# Patient Record
Sex: MALE | Race: WHITE
[De-identification: names, ages, dates, MRNs, and addresses within clinical notes are randomized per-mention and may not be internally consistent; named-entity substitution may affect disease eponyms.]

---

## 2015-06-19 VITALS
DIASTOLIC BLOOD PRESSURE: 77 MMHG | SYSTOLIC BLOOD PRESSURE: 137 MMHG | SYSTOLIC BLOOD PRESSURE: 137 MMHG | DIASTOLIC BLOOD PRESSURE: 77 MMHG | SYSTOLIC BLOOD PRESSURE: 137 MMHG | DIASTOLIC BLOOD PRESSURE: 77 MMHG

## 2017-05-30 ENCOUNTER — HOSPITAL ENCOUNTER (OUTPATIENT)
Dept: HOSPITAL 61 - ECHO | Age: 72
Discharge: HOME | End: 2017-05-30
Attending: INTERNAL MEDICINE
Payer: MEDICARE

## 2017-05-30 DIAGNOSIS — E78.5: ICD-10-CM

## 2017-05-30 DIAGNOSIS — I25.10: ICD-10-CM

## 2017-05-30 DIAGNOSIS — I10: ICD-10-CM

## 2017-05-30 DIAGNOSIS — I08.1: Primary | ICD-10-CM

## 2017-05-30 DIAGNOSIS — F17.200: ICD-10-CM

## 2017-05-30 DIAGNOSIS — R00.1: ICD-10-CM

## 2017-05-30 PROCEDURE — 93306 TTE W/DOPPLER COMPLETE: CPT

## 2017-05-30 NOTE — CARD
--------------- APPROVED REPORT --------------





EXAM: Two-dimensional and M-mode echocardiogram with Doppler and color Doppler.



Other Information 

Quality : GoodHR: 50bpm

Rhythm : Bradycardia



INDICATION

Dyspnea 



RISK FACTORS

Hypertension 

Hyperlipidemia

Smoking 



2D DIMENSIONS 

RVDd2.8 (2.9-3.5cm)Left Atrium(2D)5.0 (1.6-4.0cm)

IVSd1.2 (0.7-1.1cm)Aortic Root(2D)3.2 (2.0-3.7cm)

LVDd5.6 (3.9-5.9cm)LVOT Diameter2.4 (1.8-2.4cm)

PWd1.3 (0.7-1.1cm)LVDs3.8 (2.5-4.0cm)

FS (%) 32.9 %SV94.4 ml

LVEF(%)60.7 (>50%)



Aortic Valve

AoV Peak Lisandro.99.0cm/sAoV VTI23.1cm

AO Peak GR.3.9mmHgLVOT Peak Lisandro.64.5cm/s

LVOT  VTI 14.31cmAO Mean GR.2mmHg

MUNIRA (VMAX)2.69nf7CUX   (VTI)2.89cm2



Mitral Valve

MV E Dfysvrde93.5cm/sMV DECEL [id number]

MV A Fwdzvqrp75.9cm/sMV E Mean Gr.1mmHg

MV YVG26uzA/A  Ratio1.4

MV A Ghillejf86pkHNX (PHT)2.58cm2



TDI

E/Lateral E'6.0E/Medial E'7.8



Pulmonary Valve

PV Peak Srwxjqfl24.5cm/sPV Peak Grad.2mmHg



Tricuspid Valve

TR P. Vibzygwo170ca/sTR Peak Gr.22mmHg



Pulmonary Vein

S1 Nhtlbpof76.1cm/sD2 Pijohnxz35.8cm/s

PVa [id number]



 LEFT VENTRICLE 

The left ventricle is normal size. There is mild concentric left ventricular hypertrophy. Low normal 
EF of 50-55% Subtle mild global hypokinesis. Otherwise, no significant wall motion abnormalities. The
 left ventricular diastolic function and filling is normal for age. No left ventricle thrombus noted 
on this study.



 RIGHT VENTRICLE 

The right ventricle is normal size. There is normal right ventricular wall thickness. The right ventr
icular systolic function is normal.



 ATRIA 

The left atrium is mdoerately dilated. The right atrium size is normal. The interatrial septum is int
act with no evidence for an atrial septal defect or patent foramen ovale as noted on 2-D or Doppler i
maging.



 AORTIC VALVE 

The aortic valve is not well visualized. The aortic valve is trileaflet. Doppler and Color Flow revea
led no significant aortic regurgitation. There is no significant aortic valvular stenosis.



 MITRAL VALVE 

The mitral valve leaflets are moderately thickened. There is no evidence of mitral valve prolapse. Th
ere is no mitral valve stenosis. Doppler and Color Flow revealed mild mitral regurgitation.



 TRICUSPID VALVE 

Doppler and Color Flow revealed mild tricuspid regurgitation. The pulmonary artery systolic pressure 
is estimated at 25 mmHg. There is no pulmonary hypertension.



 PULMONIC VALVE 

Doppler and Color Flow revealed trace pulmonic valvular regurgitation. There is no pulmonic valvular 
stenosis.



 GREAT VESSELS 

The aortic root is normal in size. The ascending aorta is normal in size. The pulmonary artery is nor
mal. The IVC is normal in size and collapses >50% with inspiration.



 PERICARDIAL EFFUSION 

There is no evidence of significant pericardial effusion.



Critical Notification

Critical Value: No



<Conclusion>

The left ventricular systolic function is normal and the ejection fraction is within normal range.  T
he Ejection Fraction is 55%.

Low normal EF of 50-55%

Subtle mild global hypokinesis. Otherwise, no significant wall motion abnormalities.

Doppler and Color Flow revealed mild mitral regurgitation.

## 2019-08-26 ENCOUNTER — HOSPITAL ENCOUNTER (OUTPATIENT)
Dept: HOSPITAL 61 - CT | Age: 74
Discharge: HOME | End: 2019-08-26
Attending: INTERNAL MEDICINE
Payer: MEDICARE

## 2019-08-26 DIAGNOSIS — J84.10: ICD-10-CM

## 2019-08-26 DIAGNOSIS — I25.10: ICD-10-CM

## 2019-08-26 DIAGNOSIS — Z95.1: ICD-10-CM

## 2019-08-26 DIAGNOSIS — I70.0: ICD-10-CM

## 2019-08-26 DIAGNOSIS — Z12.2: Primary | ICD-10-CM

## 2019-08-26 DIAGNOSIS — R91.8: ICD-10-CM

## 2019-08-26 DIAGNOSIS — Z87.891: ICD-10-CM

## 2019-08-26 DIAGNOSIS — J43.9: ICD-10-CM

## 2019-08-26 DIAGNOSIS — K76.89: ICD-10-CM

## 2019-08-26 DIAGNOSIS — N28.1: ICD-10-CM

## 2019-08-26 PROCEDURE — 71250 CT THORAX DX C-: CPT

## 2019-08-26 NOTE — RAD
PQRS Compliance Statement:

 

One or more of the following individualized dose reduction techniques were

utilized for this examination:  

1. Automated exposure control  

2. Adjustment of the mA and/or kV according to patient size  

3. Use of iterative reconstruction technique

 

CT chest without contrast (lung cancer screening) 8/26/2019

 

INDICATION: Smoker for 30 years, smoking 1 pack a day.

 

COMPARISON: None available

 

TECHNIQUE: Multiple axial CT images of the chest were obtained without 

intravenous contrast utilizing low-dose technique. Coronal and sagittal 

reformats are provided.

 

FINDINGS:

 

There is biapical pleural-parenchymal scarring. Mild paraseptal 

emphysematous changes are identified in the upper lobes. Subpleural 

calcified granulomas identified in the anterior right middle lobe 

measuring 7 mm. Subpleural calcified nodule is identified in the posterior

right lower lobe measuring 5 mm. There is a subpleural calcified nodule in

the inferior lingula measuring 14 mm. No solid noncalcified pulmonary 

nodules are identified. There are no pleural effusions. No pulmonary 

vascular congestion or pneumothorax.

 

No pathologically enlarged thoracic lymph nodes. Heart size is within 

normal limits. Coronary artery vascular calcifications are present. 

Thoracic aorta is normal in course and caliber with mild calcified 

atheromatous plaque.

 

Median sternotomy changes are present. No suspicious osseous abnormality 

is identified.

 

Left hepatic lobe cyst measures 18 mm. Right hepatic lobe cyst measures 15

mm. Spleen and bilateral adrenal glands are normal in appearance. Superior

pole left renal cyst measures 5.7 cm with minimal peripheral 

calcification. Right interpolar renal cyst measures 6.5 cm.

 

IMPRESSION:

1. Calcified pulmonary nodules most favor sequela prior granulomatous 

exposure (lung RADS category 1, negative). No suspicious solid 

noncalcified pulmonary nodules. Recommend low-dose chest CT in one year.

2. Simple hepatic cysts.

3. 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 

CABG.

4. Suspect simple renal cysts. Further characterization with renal 

ultrasound may be of benefit.

 

Electronically signed by: Kathleen العراقي MD (8/26/2019 11:24 AM) 

[facility]

## 2020-10-05 ENCOUNTER — HOSPITAL ENCOUNTER (OUTPATIENT)
Dept: HOSPITAL 61 - CT | Age: 75
Discharge: HOME | End: 2020-10-05
Attending: INTERNAL MEDICINE
Payer: MEDICARE

## 2020-10-05 DIAGNOSIS — Z95.1: ICD-10-CM

## 2020-10-05 DIAGNOSIS — J98.11: Primary | ICD-10-CM

## 2020-10-05 DIAGNOSIS — J84.10: ICD-10-CM

## 2020-10-05 DIAGNOSIS — J98.4: ICD-10-CM

## 2020-10-05 DIAGNOSIS — M51.34: ICD-10-CM

## 2020-10-05 PROCEDURE — 71250 CT THORAX DX C-: CPT

## 2020-10-05 NOTE — RAD
EXAM: CT CHEST WITHOUT CONTRAST

 

HISTORY: Pulmonary nodules

 

COMPARISON: CT chest 8/26/2019

 

TECHNIQUE:  Helical CT of the chest performed without contrast. Coronal 

and sagittal reformats were obtained.

 

One or more of the following individualized dose reduction techniques were

utilized for this examination:  

 

1. Automated exposure control

2. Adjustment of the mA and/or kV according to patient size

3. Use of iterative reconstruction technique.

 

FINDINGS: 

 

Thyroid gland and thoracic inlet: Visualized portion of the thyroid gland 

is normal.

 

Heart and great vessels: Heart is normal in size. There are surgical 

changes of CABG. No pericardial effusion. Thoracic aorta is normal in 

caliber. Mild aortic calcifications.

 

Mediastinum and mirtha: No mediastinal or hilar lymphadenopathy.

 

Lungs and pleura: There is biapical pleural parenchymal scarring. 

Scattered subpleural calcified granulomas in the right middle lobe, right 

lower lobe, and left upper lobe are unchanged. There is mild atelectasis 

in the left lower lobe.

 

Chest wall and axillae: Chest wall is unremarkable. No axillary 

lymphadenopathy.

 

Upper abdomen: Partially visualized exophytic simple left renal cyst and 

several low density liver cysts measuring up to 1.7 cm are unchanged.

 

Bones: There is mild degenerative disc disease in the thoracic spine.

 

IMPRESSION:  Unchanged calcified granulomas in the lungs. No suspicious 

pulmonary nodule.

 

Electronically signed by: Batsheva Burch MD (10/5/2020 3:06 PM) [id number]

## 2020-12-07 ENCOUNTER — HOSPITAL ENCOUNTER (EMERGENCY)
Dept: HOSPITAL 61 - ER | Age: 75
LOS: 1 days | Discharge: HOME | End: 2020-12-08
Payer: MEDICARE

## 2020-12-07 VITALS — WEIGHT: 158.29 LBS | HEIGHT: 67 IN | BODY MASS INDEX: 24.84 KG/M2

## 2020-12-07 DIAGNOSIS — E86.0: Primary | ICD-10-CM

## 2020-12-07 DIAGNOSIS — Z87.891: ICD-10-CM

## 2020-12-07 LAB
ALBUMIN SERPL-MCNC: 3.1 G/DL (ref 3.4–5)
ALBUMIN/GLOB SERPL: 0.8 {RATIO} (ref 1–1.7)
ALP SERPL-CCNC: 57 U/L (ref 46–116)
ALT SERPL-CCNC: 20 U/L (ref 16–63)
ANION GAP SERPL CALC-SCNC: 6 MMOL/L (ref 6–14)
AST SERPL-CCNC: 26 U/L (ref 15–37)
BASOPHILS # BLD AUTO: 0 X10^3/UL (ref 0–0.2)
BASOPHILS NFR BLD: 1 % (ref 0–3)
BILIRUB SERPL-MCNC: 1.1 MG/DL (ref 0.2–1)
BUN SERPL-MCNC: 28 MG/DL (ref 8–26)
BUN/CREAT SERPL: 22 (ref 6–20)
CALCIUM SERPL-MCNC: 8.6 MG/DL (ref 8.5–10.1)
CHLORIDE SERPL-SCNC: 98 MMOL/L (ref 98–107)
CO2 SERPL-SCNC: 30 MMOL/L (ref 21–32)
CREAT SERPL-MCNC: 1.3 MG/DL (ref 0.7–1.3)
D DIMER PPP FEU-MCNC: 0.68 UG/MLFEU (ref 0–0.5)
EOSINOPHIL NFR BLD: 0 % (ref 0–3)
EOSINOPHIL NFR BLD: 0 X10^3/UL (ref 0–0.7)
ERYTHROCYTE [DISTWIDTH] IN BLOOD BY AUTOMATED COUNT: 13.8 % (ref 11.5–14.5)
GFR SERPLBLD BASED ON 1.73 SQ M-ARVRAT: 53.8 ML/MIN
GLUCOSE SERPL-MCNC: 126 MG/DL (ref 70–99)
HCT VFR BLD CALC: 39 % (ref 39–53)
HGB BLD-MCNC: 13.5 G/DL (ref 13–17.5)
LYMPHOCYTES # BLD: 0.5 X10^3/UL (ref 1–4.8)
LYMPHOCYTES NFR BLD AUTO: 10 % (ref 24–48)
MCH RBC QN AUTO: 34 PG (ref 25–35)
MCHC RBC AUTO-ENTMCNC: 35 G/DL (ref 31–37)
MCV RBC AUTO: 98 FL (ref 79–100)
MONO #: 0.3 X10^3/UL (ref 0–1.1)
MONOCYTES NFR BLD: 7 % (ref 0–9)
NEUT #: 4 X10^3/UL (ref 1.8–7.7)
NEUTROPHILS NFR BLD AUTO: 82 % (ref 31–73)
PLATELET # BLD AUTO: 108 X10^3/UL (ref 140–400)
POTASSIUM SERPL-SCNC: 3.3 MMOL/L (ref 3.5–5.1)
PROT SERPL-MCNC: 6.9 G/DL (ref 6.4–8.2)
PROTHROMBIN TIME: 13.6 SEC (ref 11.7–14)
RBC # BLD AUTO: 3.98 X10^6/UL (ref 4.3–5.7)
SODIUM SERPL-SCNC: 134 MMOL/L (ref 136–145)
WBC # BLD AUTO: 4.9 X10^3/UL (ref 4–11)

## 2020-12-07 PROCEDURE — 71045 X-RAY EXAM CHEST 1 VIEW: CPT

## 2020-12-07 PROCEDURE — 99285 EMERGENCY DEPT VISIT HI MDM: CPT

## 2020-12-07 PROCEDURE — 81001 URINALYSIS AUTO W/SCOPE: CPT

## 2020-12-07 PROCEDURE — 93005 ELECTROCARDIOGRAM TRACING: CPT

## 2020-12-07 PROCEDURE — 83880 ASSAY OF NATRIURETIC PEPTIDE: CPT

## 2020-12-07 PROCEDURE — 85610 PROTHROMBIN TIME: CPT

## 2020-12-07 PROCEDURE — 96360 HYDRATION IV INFUSION INIT: CPT

## 2020-12-07 PROCEDURE — 84484 ASSAY OF TROPONIN QUANT: CPT

## 2020-12-07 PROCEDURE — 85025 COMPLETE CBC W/AUTO DIFF WBC: CPT

## 2020-12-07 PROCEDURE — 36415 COLL VENOUS BLD VENIPUNCTURE: CPT

## 2020-12-07 PROCEDURE — 80053 COMPREHEN METABOLIC PANEL: CPT

## 2020-12-07 PROCEDURE — 96361 HYDRATE IV INFUSION ADD-ON: CPT

## 2020-12-07 PROCEDURE — 85379 FIBRIN DEGRADATION QUANT: CPT

## 2020-12-07 PROCEDURE — 83605 ASSAY OF LACTIC ACID: CPT

## 2020-12-07 NOTE — ED.ADGEN
Past Medical History


Smoking Status:  Former Smoker





General Adult


EDM:


Chief Complaint:  SYNCOPE





HPI:


HPI:





Patient is a 75  year old male brought in by EMS for near syncopal episode.  Has

felt lightheaded but then states he almost passed out when trying to go get the 

shower.  States he did not fall or hit his head but has an abrasion to his right

elbow.  Patient tested positive for Covid 19 about 5 days ago, has about 10 days

of symptoms.  Patient states he has not been coughing severely and does not feel

short of breath.  Denies any pain anywhere.  Patient states that he does not 

think he has been eating and drinking well enough, and has a few episodes of 

nonbloody nonmelanotic diarrhea yesterday.  Denies any fevers.  Denies any 

changes in urination.  Not had any nausea or vomiting.  Per EMS his systolic 

blood pressure was 147 laying and 88 standing, given 400 cc NS in route





Review of Systems:


Review of Systems:


Constitutional:   Denies fever or chills. []


Eyes:   Denies change in visual acuity. []


HENT:   Denies nasal congestion or sore throat. [] 


Respiratory:   Denies cough or shortness of breath. [] 


Cardiovascular:   Denies chest pain or edema. [] Lightheaded and near syncope


GI:   Denies abdominal pain, nausea, vomiting, but had diarrhea yesterday


:  Denies dysuria. [] 


Musculoskeletal:   Denies back pain or joint pain. [] 


Integument:   Denies rash. [] 


Neurologic:   Denies headache, focal weakness or sensory changes. [] 


Endocrine:   Denies polyuria or polydipsia. [] 


Lymphatic:  Denies swollen glands. [] 


Psychiatric:  Denies depression or anxiety. []





Current Medications:





Current Medications








 Medications


  (Trade)  Dose


 Ordered  Sig/Uri  Start Time


 Stop Time Status Last Admin


Dose Admin


 


 Sodium Chloride  1,000 ml @ 


 1,000 mls/hr  1X  ONCE  12/7/20 23:30


 12/8/20 00:29 DC 12/7/20 23:24


1,000 MLS/HR











Allergies:


Allergies:





Allergies








Coded Allergies Type Severity Reaction Last Updated Verified


 


  No Known Drug Allergies    6/15/15 No











Physical Exam:


PE:





Constitutional: Well developed, well nourished, no acute distress, non-toxic 

appearance. []


HENT: Normocephalic, atraumatic, bilateral external ears normal, oropharynx 

moist, no oral exudates, nose normal. []


Eyes: PERRLA, EOMI, conjunctiva normal, no discharge. [] 


Neck: Normal range of motion, no tenderness, supple, no stridor. [] 


Cardiovascular:Heart rate regular rhythm, no murmur []


Lungs & Thorax:  Bilateral breath sounds clear to auscultation []


Abdomen: Bowel sounds normal, soft, no tenderness, no masses, no pulsatile 

masses. [] 


Skin: Warm, dry, no erythema, no rash. [] 


Back: No tenderness, no CVA tenderness. [] 


Extremities: No tenderness, no cyanosis, no clubbing, ROM intact, no edema. [] 


Neurologic: Alert and oriented X 3, normal motor function, normal sensory 

function, no focal deficits noted. []


Psychologic: Affect normal, judgement normal, mood normal. []





Current Patient Data:


Labs:





                                Laboratory Tests








Test


 12/7/20


23:20


 


White Blood Count


 4.9 x10^3/uL


(4.0-11.0)


 


Red Blood Count


 3.98 x10^6/uL


(4.30-5.70)  L


 


Hemoglobin


 13.5 g/dL


(13.0-17.5)


 


Hematocrit


 39.0 %


(39.0-53.0)


 


Mean Corpuscular Volume


 98 fL ()





 


Mean Corpuscular Hemoglobin 34 pg (25-35)  


 


Mean Corpuscular Hemoglobin


Concent 35 g/dL


(31-37)


 


Red Cell Distribution Width


 13.8 %


(11.5-14.5)


 


Platelet Count


 108 x10^3/uL


(140-400)  L


 


Neutrophils (%) (Auto) 82 % (31-73)  H


 


Lymphocytes (%) (Auto) 10 % (24-48)  L


 


Monocytes (%) (Auto) 7 % (0-9)  


 


Eosinophils (%) (Auto) 0 % (0-3)  


 


Basophils (%) (Auto) 1 % (0-3)  


 


Neutrophils # (Auto)


 4.0 x10^3/uL


(1.8-7.7)


 


Lymphocytes # (Auto)


 0.5 x10^3/uL


(1.0-4.8)  L


 


Monocytes # (Auto)


 0.3 x10^3/uL


(0.0-1.1)


 


Eosinophils # (Auto)


 0.0 x10^3/uL


(0.0-0.7)


 


Basophils # (Auto)


 0.0 x10^3/uL


(0.0-0.2)


 


Prothrombin Time


 13.6 SEC


(11.7-14.0)


 


Prothrombin Time INR 1.1 (0.8-1.1)  


 


D-Dimer (Minoo)


 0.68 ug/mlFEU


(0.00-0.50)  H


 


Sodium Level


 134 mmol/L


(136-145)  L


 


Potassium Level


 3.3 mmol/L


(3.5-5.1)  L


 


Chloride Level


 98 mmol/L


()


 


Carbon Dioxide Level


 30 mmol/L


(21-32)


 


Anion Gap 6 (6-14)  


 


Blood Urea Nitrogen


 28 mg/dL


(8-26)  H


 


Creatinine


 1.3 mg/dL


(0.7-1.3)


 


Estimated GFR


(Cockcroft-Gault) 53.8  





 


BUN/Creatinine Ratio 22 (6-20)  H


 


Glucose Level


 126 mg/dL


(70-99)  H


 


Lactic Acid Level


 1.9 mmol/L


(0.4-2.0)


 


Calcium Level


 8.6 mg/dL


(8.5-10.1)


 


Total Bilirubin


 1.1 mg/dL


(0.2-1.0)  H


 


Aspartate Amino Transferase


(AST) 26 U/L (15-37)





 


Alanine Aminotransferase (ALT)


 20 U/L (16-63)





 


Alkaline Phosphatase


 57 U/L


()


 


Troponin I Quantitative


 0.022 ng/mL


(0.000-0.055)


 


NT-Pro-B-Type Natriuretic


Peptide 422 pg/mL


(0-449)


 


Total Protein


 6.9 g/dL


(6.4-8.2)


 


Albumin


 3.1 g/dL


(3.4-5.0)  L


 


Albumin/Globulin Ratio


 0.8 (1.0-1.7)


L





                                Laboratory Tests


12/7/20 23:20








                                Laboratory Tests


12/7/20 23:20








Vital Signs:





                                   Vital Signs








  Date Time  Temp Pulse Resp B/P (MAP) Pulse Ox O2 Delivery O2 Flow Rate FiO2


 


12/7/20 22:50 100.0 79 16 145/72 (96) 97 Room Air  





 100.0       











EKG:


EKG:


Atrial fibrillation, rate 82, no obvious ST elevation or depression but limited 

by artifact left axis deviation []





Heart Score:


Risk Factors:


Risk Factors:  DM, Current or recent (<one month) smoker, HTN, HLP, family 

history of CAD, obesity.


Risk Scores:


Score 0 - 3:  2.5% MACE over next 6 weeks - Discharge Home


Score 4 - 6:  20.3% MACE over next 6 weeks - Admit for Clinical Observation


Score 7 - 10:  72.7% MACE over next 6 weeks - Early Invasive Strategies





Radiology/Procedures:


Radiology/Procedures:


EP interpretation: No pneumonia, opacities, no cardiomegaly, sternotomy wires in

 place []





Course & Med Decision Making:


Course & Med Decision Making


Pertinent Labs and Imaging studies reviewed. (See chart for details)


Patient status much improved after IV fluids.  Instructed patient to continue 

encouraging p.o. intake despite Covid and his decreased appetite and smell and 

taste.  Has had no pulmonary issues since being in the emergency department.  

Repeat troponin nonischemic


[]





Dragon Disclaimer:


Dragon Disclaimer:


This electronic medical record was generated, in whole or in part, using a voice

 recognition dictation system.





Departure


Departure


Impression:  


   Primary Impression:  


   Dehydration


Disposition:  01 DC HOME SELF CARE/HOMELESS


Condition:  IMPROVED


Referrals:  


BHAVANA LEE MD (PCP)


Patient Instructions:  Rehydration, Elderly











GARRICK NGUYEN MD               Dec 7, 2020 23:10

## 2020-12-08 VITALS
DIASTOLIC BLOOD PRESSURE: 71 MMHG | SYSTOLIC BLOOD PRESSURE: 131 MMHG | DIASTOLIC BLOOD PRESSURE: 71 MMHG | SYSTOLIC BLOOD PRESSURE: 131 MMHG

## 2020-12-08 LAB
APTT PPP: YELLOW S
BACTERIA #/AREA URNS HPF: 0 /HPF
BILIRUB UR QL STRIP: NEGATIVE
FIBRINOGEN PPP-MCNC: CLEAR MG/DL
NITRITE UR QL STRIP: NEGATIVE
PH UR STRIP: 6.5 [PH]
PROT UR STRIP-MCNC: 30 MG/DL
RBC #/AREA URNS HPF: (no result) /HPF (ref 0–2)
UROBILINOGEN UR-MCNC: 0.2 MG/DL
WBC #/AREA URNS HPF: 0 /HPF (ref 0–4)

## 2020-12-08 NOTE — EKG
[facility]

              [address] [place] [address]

Test Date:    2020               Test Time:    23:03:37

Pat Name:     ELSA ISRAEL           Department:   

Patient ID:   [id number]           Room:          

Gender:       M                        Technician:   

:          1945               Requested By: GARRICK NGUYEN

Order Number: [id number]           Reading MD:     

                                 Measurements

Intervals                              Axis          

Rate:         82                       P:            

SC:                                    QRS:          -14

QRSD:         88                       T:            -114

QT:           422                                    

QTc:          496                                    

                           Interpretive Statements

ATRIAL FLUTTER

LEFTWARD AXIS

QRS(T) CONTOUR ABNORMALITY

CONSIDER INFERIOR MYOCARDIAL DAMAGE

ST & T ABNORMALITY, CONSIDER

ANTERIOR ISCHEMIA OR LEFT VENTRICULAR STRAIN

LATERAL ISCHEMIA OR LEFT VENTRICULAR STRAIN

ABNORMAL ECG

RI6.01

No previous ECG available for comparison

## 2020-12-08 NOTE — RAD
CHEST AP ONLY

 

History: Reason: presyncope / Spl. Instructions:  / History: 

 

Comparison: July 13, 2015

 

Findings:

Low lung volumes. Mild bibasilar atelectasis. No pneumothorax. No pleural 

effusion. Left midlung calcified pulmonary nodule, unchanged, likely 

primary illness disease

 

Prior median sternotomy. Normal heart size.

 

Impression: 

1.  Low lung volumes with mild bibasilar linear atelectasis.

 

Electronically signed by: Kalyan Biggs DO (12/8/2020 5:13 AM) [facility]

## 2021-11-03 ENCOUNTER — HOSPITAL ENCOUNTER (OUTPATIENT)
Dept: HOSPITAL 61 - CT | Age: 76
End: 2021-11-03
Attending: INTERNAL MEDICINE
Payer: MEDICARE

## 2021-11-03 DIAGNOSIS — J44.9: ICD-10-CM

## 2021-11-03 DIAGNOSIS — I25.10: ICD-10-CM

## 2021-11-03 DIAGNOSIS — J90: Primary | ICD-10-CM

## 2021-11-03 DIAGNOSIS — N20.0: ICD-10-CM

## 2021-11-03 DIAGNOSIS — I70.0: ICD-10-CM

## 2021-11-03 DIAGNOSIS — K76.89: ICD-10-CM

## 2021-11-03 DIAGNOSIS — J98.11: ICD-10-CM

## 2021-11-03 DIAGNOSIS — J84.10: ICD-10-CM

## 2021-11-03 DIAGNOSIS — K80.20: ICD-10-CM

## 2021-11-03 DIAGNOSIS — J98.4: ICD-10-CM

## 2021-11-03 DIAGNOSIS — N28.1: ICD-10-CM

## 2021-11-03 PROCEDURE — 71250 CT THORAX DX C-: CPT

## 2021-11-03 NOTE — RAD
EXAM: CT CHEST WITHOUT CONTRAST



HISTORY: COPD



COMPARISON: CT chest 10/5/2020



TECHNIQUE:  Helical CT of the chest performed without contrast. Coronal and sagittal reformats were o
btained.



One or more of the following individualized dose reduction techniques were utilized for this examinat
ion:  



1. Automated exposure control

2. Adjustment of the mA and/or kV according to patient size

3. Use of iterative reconstruction technique.



FINDINGS: 



Thyroid gland and thoracic inlet: Visualized portion of the thyroid gland is normal.



Heart and great vessels: The heart is normal in size. There are coronary artery calcifications and maharaj
rgical changes of CABG. The thoracic aorta is normal in caliber. Mild calcified aortic atherosclerosi
s.



Mediastinum and mirtha: Unchanged precarinal lymph node measuring 6 mm short axis. No lymphadenopathy.



Lungs and pleura: A trace left pleural effusion is unchanged. There are adjacent subpleural bandlike 
confluent opacities in the left lower lobe consistent with atelectasis, the unchanged. There are a fe
w scattered subpleural calcified granulomas in the lungs. No suspicious pulmonary nodule. Unchanged m
ild pleural parenchymal scarring in the apices and mild paraseptal emphysema. Central airways are leny
ar. No pleural effusion. 



Chest wall and axillae: No axillary lymphadenopathy.



Upper abdomen: There is a partially visualized exophytic left simple cyst measuring at least 8 cm. Ad
ditional 1.6 cm left renal cysts. There is left nephrolithiasis. Cholelithiasis. There is a 1.6 cm si
mple cyst in the left hepatic lobe and a few other subcentimeter hypodensities in the liver that are 
too small characterize.



Bones: No acute osseous abnormality.



IMPRESSION: 

1.  Unchanged trace left pleural effusion and adjacent atelectasis in the left lower lobe. Scattered 
calcified granulomas. Mild paraseptal emphysema.

2.  Unchanged hepatic cysts.

3.  There are 2 fluid density left renal cysts, one of which is partially visualized and measures at 
least 8 cm.

4.  Cholelithiasis.



Electronically signed by: Batsheva Burch MD (11/3/2021 2:18 PM) [id number]